# Patient Record
Sex: FEMALE | Race: WHITE | URBAN - METROPOLITAN AREA
[De-identification: names, ages, dates, MRNs, and addresses within clinical notes are randomized per-mention and may not be internally consistent; named-entity substitution may affect disease eponyms.]

---

## 2024-04-18 ENCOUNTER — APPOINTMENT (RX ONLY)
Dept: URBAN - METROPOLITAN AREA CLINIC 141 | Facility: CLINIC | Age: 49
Setting detail: DERMATOLOGY
End: 2024-04-18

## 2024-04-18 DIAGNOSIS — Z41.9 ENCOUNTER FOR PROCEDURE FOR PURPOSES OTHER THAN REMEDYING HEALTH STATE, UNSPECIFIED: ICD-10-CM

## 2024-04-18 PROCEDURE — ? FACIAL

## 2024-04-18 PROCEDURE — ? INVENTORY

## 2024-04-18 ASSESSMENT — LOCATION ZONE DERM: LOCATION ZONE: FACE

## 2024-04-18 ASSESSMENT — LOCATION DETAILED DESCRIPTION DERM: LOCATION DETAILED: INFERIOR MID FOREHEAD

## 2024-04-18 ASSESSMENT — LOCATION SIMPLE DESCRIPTION DERM: LOCATION SIMPLE: INFERIOR FOREHEAD

## 2024-04-18 NOTE — PROCEDURE: FACIAL
Assessment: Photodamage
Mask Type (Optional): papaya
Treatment Serum (Optional): Vitamin C
Treatment Type (Optional): Refresh Anti-Aging Facial
Exfoliation Type: enzyme
Facial Steaming: steamed
Detail Level: Zone